# Patient Record
Sex: FEMALE | Race: WHITE | Employment: OTHER | URBAN - METROPOLITAN AREA
[De-identification: names, ages, dates, MRNs, and addresses within clinical notes are randomized per-mention and may not be internally consistent; named-entity substitution may affect disease eponyms.]

---

## 2017-05-08 ENCOUNTER — RADIANT APPOINTMENT (OUTPATIENT)
Dept: MAMMOGRAPHY | Facility: CLINIC | Age: 72
End: 2017-05-08
Payer: COMMERCIAL

## 2017-05-08 ENCOUNTER — OFFICE VISIT (OUTPATIENT)
Dept: OBGYN | Facility: CLINIC | Age: 72
End: 2017-05-08
Payer: COMMERCIAL

## 2017-05-08 VITALS
DIASTOLIC BLOOD PRESSURE: 68 MMHG | BODY MASS INDEX: 28.28 KG/M2 | WEIGHT: 176 LBS | SYSTOLIC BLOOD PRESSURE: 128 MMHG | HEIGHT: 66 IN

## 2017-05-08 DIAGNOSIS — Z01.419 ENCOUNTER FOR GYNECOLOGICAL EXAMINATION WITHOUT ABNORMAL FINDING: Primary | ICD-10-CM

## 2017-05-08 DIAGNOSIS — Z79.890 HORMONE REPLACEMENT THERAPY (POSTMENOPAUSAL): ICD-10-CM

## 2017-05-08 DIAGNOSIS — Z79.890 POST-MENOPAUSE ON HRT (HORMONE REPLACEMENT THERAPY): ICD-10-CM

## 2017-05-08 DIAGNOSIS — Z12.31 VISIT FOR SCREENING MAMMOGRAM: ICD-10-CM

## 2017-05-08 PROCEDURE — 99397 PER PM REEVAL EST PAT 65+ YR: CPT | Performed by: OBSTETRICS & GYNECOLOGY

## 2017-05-08 PROCEDURE — G0202 SCR MAMMO BI INCL CAD: HCPCS | Mod: TC

## 2017-05-08 RX ORDER — ESTRADIOL 0.04 MG/D
1 PATCH, EXTENDED RELEASE TRANSDERMAL
Qty: 24 PATCH | Refills: 3 | Status: SHIPPED | OUTPATIENT
Start: 2017-05-08 | End: 2018-05-15

## 2017-05-08 RX ORDER — MEDROXYPROGESTERONE ACETATE 5 MG
5 TABLET ORAL DAILY
Qty: 90 TABLET | Refills: 3 | Status: SHIPPED | OUTPATIENT
Start: 2017-05-08 | End: 2018-05-15

## 2017-05-08 ASSESSMENT — ANXIETY QUESTIONNAIRES
3. WORRYING TOO MUCH ABOUT DIFFERENT THINGS: NOT AT ALL
2. NOT BEING ABLE TO STOP OR CONTROL WORRYING: NOT AT ALL
6. BECOMING EASILY ANNOYED OR IRRITABLE: SEVERAL DAYS
5. BEING SO RESTLESS THAT IT IS HARD TO SIT STILL: NOT AT ALL
IF YOU CHECKED OFF ANY PROBLEMS ON THIS QUESTIONNAIRE, HOW DIFFICULT HAVE THESE PROBLEMS MADE IT FOR YOU TO DO YOUR WORK, TAKE CARE OF THINGS AT HOME, OR GET ALONG WITH OTHER PEOPLE: NOT DIFFICULT AT ALL
1. FEELING NERVOUS, ANXIOUS, OR ON EDGE: NOT AT ALL
7. FEELING AFRAID AS IF SOMETHING AWFUL MIGHT HAPPEN: NOT AT ALL
GAD7 TOTAL SCORE: 1

## 2017-05-08 ASSESSMENT — PATIENT HEALTH QUESTIONNAIRE - PHQ9: 5. POOR APPETITE OR OVEREATING: NOT AT ALL

## 2017-05-08 NOTE — MR AVS SNAPSHOT
"              After Visit Summary   2017    Kenia Rausch    MRN: [id number]           Patient Information     Date Of Birth          1945        Visit Information        Provider Department      2017 12:30 PM Ivett Cardenas MD [facility]        Today's Diagnoses     Encounter for gynecological examination without abnormal finding    -  1    Hormone replacement therapy (postmenopausal)        Post-menopause on HRT (hormone replacement therapy)           Follow-ups after your visit        Who to contact     If you have questions or need follow up information about today's clinic visit or your schedule please contact [facility] directly at [phone].  Normal or non-critical lab and imaging results will be communicated to you by MyChart, letter or phone within 4 business days after the clinic has received the results. If you do not hear from us within 7 days, please contact the clinic through MyChart or phone. If you have a critical or abnormal lab result, we will notify you by phone as soon as possible.  Submit refill requests through [employer] or call your pharmacy and they will forward the refill request to us. Please allow 3 business days for your refill to be completed.          Additional Information About Your Visit        MyChart Information     ClassOwl lets you send messages to your doctor, view your test results, renew your prescriptions, schedule appointments and more. To sign up, go to [email or web address] . Click on \"Log in\" on the left side of the screen, which will take you to the Welcome page. Then click on \"Sign up Now\" on the right side of the page.     You will be asked to enter the access code listed below, as well as some personal information. Please follow the directions to create your username and password.     Your access code is: [email or web address]  Expires: 2017  1:16 PM     Your access code will  in 90 days. If you need " "help or a new code, please call your [place] clinic or [phone].        Care EveryWhere ID     This is your Care EveryWhere ID. This could be used by other organizations to access your [place] medical records  [id number]        Your Vitals Were     Height BMI (Body Mass Index)                5' 6\" (1.676 m) 28.41 kg/m2           Blood Pressure from Last 3 Encounters:   05/08/17 128/68   04/20/16 120/70   04/13/15 112/66    Weight from Last 3 Encounters:   05/08/17 176 lb (79.8 kg)   04/20/16 173 lb (78.5 kg)   04/13/15 171 lb (77.6 kg)              Today, you had the following     No orders found for display         Where to get your medicines      These medications were sent to [id number] - [place], [place] - [address] AT [facility] [place] & [facility] [address]  [address], [place] [address]    Hours:  24-hours Phone:  [phone]     estradiol 0.0375 MG/24HR BIW patch    medroxyPROGESTERone 5 MG tablet          Primary Care Provider Office Phone # Fax #    [facility] [phone]       [address] [place] [address], [address]  [facility] [address]        Thank you!     Thank you for choosing [facility]  for your care. Our goal is always to provide you with excellent care. Hearing back from our patients is one way we can continue to improve our services. Please take a few minutes to complete the written survey that you may receive in the mail after your visit with us. Thank you!             Your Updated Medication List - Protect others around you: Learn how to safely use, store and throw away your medicines at www.disposemymeds.org.          This list is accurate as of: 5/8/17  1:16 PM.  Always use your most recent med list.                   Brand Name Dispense Instructions for use    estradiol 0.0375 MG/24HR BIW patch    VIVELLE-DOT    24 patch    Place 1 patch onto the skin twice a week       medroxyPROGESTERone 5 MG tablet    " PROVERA    90 tablet    Take 1 tablet (5 mg) by mouth daily       * NEXIUM PO          * esomeprazole 20 MG CR capsule    nexIUM    90 capsule    Take 1 capsule (20 mg) by mouth every morning (before breakfast) Take 30-60 minutes before eating.       valACYclovir 500 MG tablet    VALTREX    6 tablet    Take 500 mg by mouth 2 times daily Reported on 5/8/2017       * Notice:  This list has 2 medication(s) that are the same as other medications prescribed for you. Read the directions carefully, and ask your doctor or other care provider to review them with you.

## 2017-05-08 NOTE — PROGRESS NOTES
Kenia is a 71 year old  female who presents for annual exam.     Besides routine health maintenance, she has no other health concerns today .    HPI:  The patient's PCP is [facility].  Sees Dr Suarez  Patient has back injury right now, so hard time walking  mammo today  Wants to continue hormone replacement therapy, refilled      GYNECOLOGIC HISTORY:    No LMP recorded. Patient is postmenopausal.  Her current contraception method is: menopause.  She  reports that she has never smoked. She does not have any smokeless tobacco history on file.    Patient is sexually active.  STD testing offered?  Declined  Last PHQ-9 score on record =   PHQ-9 SCORE 2016   Total Score 0     Last GAD7 score on record =   NEELA-7 SCORE 2016   Total Score 0     Alcohol Score = 4    HEALTH MAINTENANCE:  Cholesterol: PCP does  Last Mammo: 16, Result: normal, Next Mammo: today   Pap:  PAP ) 12, WNL  Colonoscopy:  12/16/15, Result: polyps, Next Colonoscopy: 2017 years.  Dexa:  Done elsewhere cannot remember the date    Health maintenance updated:  yes    HISTORY:  Obstetric History       T2      TAB0   SAB0   E0   M0   L2       # Outcome Date GA Lbr [place]/2nd Weight Sex Delivery Anes PTL Lv   2 Term      CS-Unspec      1 Term      CS-Unspec             Patient Active Problem List   Diagnosis     fsocLUMBAR DISC DISPLACEMENT     Nonallopathic lesion of lumbar region     Nonallopathic lesion of thoracic region     Adhesive capsulitis     Past Surgical History:   Procedure Laterality Date     c-sections      X2     ORTHOPEDIC SURGERY  2004    risotomy, disectomy      Social History   Substance Use Topics     Smoking status: Never Smoker     Smokeless tobacco: Not on file     Alcohol use 0.0 oz/week     0 Standard drinks or equivalent per week      Comment: 1 daily       Problem (# of Occurrences) Relation (Name,Age of Onset)    Breast Cancer (1) Paternal Grandmother             Current Outpatient Prescriptions   Medication Sig     estradiol (VIVELLE-DOT) 0.0375 MG/24HR Place 1 patch onto the skin twice a week     valACYclovir (VALTREX) 500 MG tablet Take 1 tablet (500 mg) by mouth 2 times daily     medroxyPROGESTERone (PROVERA) 5 MG tablet Take 1 tablet (5 mg) by mouth daily     esomeprazole (NEXIUM) 20 MG capsule Take 1 capsule (20 mg) by mouth every morning (before breakfast) Take 30-60 minutes before eating.     Esomeprazole Magnesium (NEXIUM PO)      No current facility-administered medications for this visit.      Allergies   Allergen Reactions     Penicillins Hives       Past medical, surgical, social and family histories were reviewed and updated in EPIC.    ROS:   12 point review of systems negative other than symptoms noted below.    EXAM:  There were no vitals taken for this visit.   BMI: There is no height or weight on file to calculate BMI.    PHYSICAL EXAM:  Constitutional:  Appearance: Well nourished, well developed, alert, in no acute distress  Neck:  Lymph Nodes:  No lymphadenopathy present    Thyroid:  Gland size normal, nontender, no nodules or masses present  on palpation  Chest:  Respiratory Effort:  Breathing unlabored  Cardiovascular:    Heart: Auscultation:  Regular rate, normal rhythm, no murmurs present  Breasts: Inspection of Breasts:  No lymphadenopathy present    Palpation of Breasts and Axillae:  No masses present on palpation, no  breast tenderness    Axillary Lymph Nodes:  No lymphadenopathy present  Gastrointestinal:   Abdominal Examination:  Abdomen nontender to palpation, tone normal without rigidity or guarding, no masses present, umbilicus without lesions   Liver and Spleen:  No hepatomegaly present, liver nontender to palpation    Hernias:  No hernias present  Lymphatic: Lymph Nodes:  No other lymphadenopathy present  Skin:  General Inspection:  No rashes present, no lesions present, no areas of  discoloration    Genitalia and Groin:  No rashes  present, no lesions present, no areas of  discoloration, no masses present  Neurologic/Psychiatric:    Mental Status:  Oriented X3     Pelvic Exam:  External Genitalia:     Normal appearance for age, no discharge present, no tenderness present, no inflammatory lesions present, color normal  Vagina:     Normal vaginal vault without central or paravaginal defects, no discharge present, no inflammatory lesions present, no masses present  Bladder:     Nontender to palpation  Urethra:   Urethral Body:  Urethra palpation normal, urethra structural support normal   Urethral Meatus:  No erythema or lesions present  Cervix:     Appearance healthy, no lesions present, nontender to palpation, no bleeding present  Uterus:     Nontender to palpation, no masses present, position anteflexed, mobility: normal  Adnexa:     No adnexal tenderness present, no adnexal masses present  Perineum:     Perineum within normal limits, no evidence of trauma, no rashes or skin lesions present  Anus:     Anus within normal limits, no hemorrhoids present  Inguinal Lymph Nodes:     No lymphadenopathy present  Pubic Hair:     Normal pubic hair distribution for age  Genitalia and Groin:     No rashes present, no lesions present, no areas of discoloration, no masses present    COUNSELING:   Reviewed preventive health counseling, as reflected in patient instructions       Regular exercise       Healthy diet/nutrition    BMI: There is no height or weight on file to calculate BMI.      ASSESSMENT:  71 year old female with satisfactory annual exam.  No diagnosis found.    PLAN:  prob needs another dexa scan    Ivett Cardenas MD

## 2017-05-09 ASSESSMENT — PATIENT HEALTH QUESTIONNAIRE - PHQ9: SUM OF ALL RESPONSES TO PHQ QUESTIONS 1-9: 2

## 2017-05-09 ASSESSMENT — ANXIETY QUESTIONNAIRES: GAD7 TOTAL SCORE: 1

## 2017-06-12 ENCOUNTER — TELEPHONE (OUTPATIENT)
Dept: OBGYN | Facility: CLINIC | Age: 72
End: 2017-06-12

## 2017-06-12 NOTE — TELEPHONE ENCOUNTER
Reason for Call:  Other call back    Detailed comments: [phone] Option 1 call the number for PA  PA for estradiol (VIVELLE-DOT) 0.0375 MG/24HR BIW [facility]    Phone Number Patient can be reached at: Home number on file [phone] (home)    Best Time: Anytime    Can we leave a detailed message on this number? YES    Call taken on 6/12/2017 at 4:11 PM by Mallory Bro

## 2017-06-19 NOTE — TELEPHONE ENCOUNTER
Pt called back regarding her prior auth.  She says she got a letter from her insurance company saying her rx was denied.  Does she need approval from Dr. Cardenas?  The prescription is for Estradiol.  Please call her at [phone].  You can leave a VM if you can't reach her.  Please let her know when she can reach you.

## 2017-06-20 NOTE — TELEPHONE ENCOUNTER
Patient calling today to follow up on documentation for a PA. Dr. Cardenas are you wanting to move forward with PA or change her medication to something more affordable? I am in triage today so wouldn't have time to work this till tomorrow.

## 2017-06-21 NOTE — TELEPHONE ENCOUNTER
Spoke with Kuldip. Notified him that we never rec'd any questions sets, just the denial letter on 6/15/17. He started the appeal process and marked as urgent. Notified patient on voicemail of process.

## 2018-05-15 ENCOUNTER — RADIANT APPOINTMENT (OUTPATIENT)
Dept: MAMMOGRAPHY | Facility: CLINIC | Age: 73
End: 2018-05-15
Payer: COMMERCIAL

## 2018-05-15 ENCOUNTER — OFFICE VISIT (OUTPATIENT)
Dept: OBGYN | Facility: CLINIC | Age: 73
End: 2018-05-15
Payer: COMMERCIAL

## 2018-05-15 VITALS
HEIGHT: 66 IN | BODY MASS INDEX: 28.48 KG/M2 | SYSTOLIC BLOOD PRESSURE: 110 MMHG | WEIGHT: 177.2 LBS | DIASTOLIC BLOOD PRESSURE: 66 MMHG | HEART RATE: 78 BPM

## 2018-05-15 DIAGNOSIS — Z12.31 VISIT FOR SCREENING MAMMOGRAM: ICD-10-CM

## 2018-05-15 DIAGNOSIS — Z79.890 HORMONE REPLACEMENT THERAPY (POSTMENOPAUSAL): ICD-10-CM

## 2018-05-15 DIAGNOSIS — Z01.419 ENCOUNTER FOR GYNECOLOGICAL EXAMINATION WITHOUT ABNORMAL FINDING: Primary | ICD-10-CM

## 2018-05-15 PROCEDURE — 77063 BREAST TOMOSYNTHESIS BI: CPT | Mod: TC

## 2018-05-15 PROCEDURE — 77067 SCR MAMMO BI INCL CAD: CPT | Mod: TC

## 2018-05-15 PROCEDURE — 99397 PER PM REEVAL EST PAT 65+ YR: CPT | Performed by: OBSTETRICS & GYNECOLOGY

## 2018-05-15 RX ORDER — MEDROXYPROGESTERONE ACETATE 5 MG
5 TABLET ORAL DAILY
Qty: 90 TABLET | Refills: 3 | Status: SHIPPED | OUTPATIENT
Start: 2018-05-15 | End: 2019-05-21

## 2018-05-15 RX ORDER — ESTRADIOL 0.04 MG/D
1 PATCH, EXTENDED RELEASE TRANSDERMAL
Qty: 24 PATCH | Refills: 3 | Status: SHIPPED | OUTPATIENT
Start: 2018-05-17 | End: 2019-05-21

## 2018-05-15 NOTE — MR AVS SNAPSHOT
"              After Visit Summary   5/15/2018    Kenia Rausch    MRN: [id number]           Patient Information     Date Of Birth          1945        Visit Information        Provider Department      5/15/2018 2:00 PM Ivett Cardenas MD [facility]        Today's Diagnoses     Encounter for gynecological examination without abnormal finding    -  1    Hormone replacement therapy (postmenopausal)           Follow-ups after your visit        Who to contact     If you have questions or need follow up information about today's clinic visit or your schedule please contact [facility] directly at [phone].  Normal or non-critical lab and imaging results will be communicated to you by MyChart, letter or phone within 4 business days after the clinic has received the results. If you do not hear from us within 7 days, please contact the clinic through [employer] or phone. If you have a critical or abnormal lab result, we will notify you by phone as soon as possible.  Submit refill requests through Medstro or call your pharmacy and they will forward the refill request to us. Please allow 3 business days for your refill to be completed.          Additional Information About Your Visit        MyChart Information     Medstro lets you send messages to your doctor, view your test results, renew your prescriptions, schedule appointments and more. To sign up, go to [email or web address] . Click on \"Log in\" on the left side of the screen, which will take you to the Welcome page. Then click on \"Sign up Now\" on the right side of the page.     You will be asked to enter the access code listed below, as well as some personal information. Please follow the directions to create your username and password.     Your access code is: [email or web address]  Expires: 2018  2:23 PM     Your access code will  in 90 days. If you need help or a new code, please call your [place] clinic or " "[phone].        Care EveryWhere ID     This is your Care EveryWhere ID. This could be used by other organizations to access your [place] medical records  [id number]        Your Vitals Were     Pulse Height BMI (Body Mass Index)             78 5' 6\" (1.676 m) 28.6 kg/m2          Blood Pressure from Last 3 Encounters:   05/15/18 110/66   05/08/17 128/68   04/20/16 120/70    Weight from Last 3 Encounters:   05/15/18 177 lb 3.2 oz (80.4 kg)   05/08/17 176 lb (79.8 kg)   04/20/16 173 lb (78.5 kg)              Today, you had the following     No orders found for display         Where to get your medicines      These medications were sent to [id number] - [place], [place] - [address] AT [facility] [place] & [facility] [address]  [address], [place] [address]     Phone:  [phone]     estradiol 0.0375 MG/24HR BIW patch    medroxyPROGESTERone 5 MG tablet          Primary Care Provider Office Phone # Fax #    [facility] [phone]       [address] [place] [address], [address]  [facility] [address]        Equal Access to Services     [facility] NATALYA AH: Hadii korina ku hadasho Soomaali, waaxda luqadaha, qaybta kaalmada adeegyada, waxjair benítez hayjaylin cedeno . So [facility] [phone].    ATENCIÓN: Si habla español, tiene a ríos disposición servicios gratuitos de asistencia lingüística. Llame al [phone].    We comply with applicable federal civil rights laws and [place] laws. We do not discriminate on the basis of race, color, national origin, age, disability, sex, sexual orientation, or gender identity.            Thank you!     Thank you for choosing [facility]  for your care. Our goal is always to provide you with excellent care. Hearing back from our patients is one way we can continue to improve our services. Please take a few minutes to complete the written survey that you may receive in the mail after your visit with us. Thank " you!             Your Updated Medication List - Protect others around you: Learn how to safely use, store and throw away your medicines at www.disposemymeds.org.          This list is accurate as of 5/15/18  2:23 PM.  Always use your most recent med list.                   Brand Name Dispense Instructions for use Diagnosis    estradiol 0.0375 MG/24HR BIW patch   Start taking on:  5/17/2018    VIVELLE-DOT    24 patch    Place 1 patch onto the skin twice a week    Hormone replacement therapy (postmenopausal)       medroxyPROGESTERone 5 MG tablet    PROVERA    90 tablet    Take 1 tablet (5 mg) by mouth daily    Hormone replacement therapy (postmenopausal)       * NEXIUM PO           * esomeprazole 20 MG CR capsule    nexIUM    90 capsule    Take 1 capsule (20 mg) by mouth every morning (before breakfast) Take 30-60 minutes before eating.    Gastroesophageal reflux disease without esophagitis       valACYclovir 500 MG tablet    VALTREX    6 tablet    Take 500 mg by mouth 2 times daily Reported on 5/8/2017        * Notice:  This list has 2 medication(s) that are the same as other medications prescribed for you. Read the directions carefully, and ask your doctor or other care provider to review them with you.

## 2018-08-23 DIAGNOSIS — Z79.890 HORMONE REPLACEMENT THERAPY (POSTMENOPAUSAL): ICD-10-CM

## 2018-08-23 NOTE — TELEPHONE ENCOUNTER
"Requested Prescriptions   Pending Prescriptions Disp Refills     estradiol (VIVELLE-DOT) 0.0375 MG/24HR BIW patch [Pharmacy Med Name: ESTRADIOL 0.0375MG PATCH (TWICE WK)] 26 patch 3     Sig: APPLY 1 PATCH EXTERNALLY TO THE SKIN 2 TIMES A WEEK    Hormone Replacement Therapy Passed    8/23/2018  3:42 PM       Passed - Blood pressure under 140/90 in past 12 months    BP Readings from Last 3 Encounters:   05/15/18 110/66   05/08/17 128/68   04/20/16 120/70                Passed - Recent (12 mo) or future (30 days) visit within the authorizing provider's specialty    Patient had office visit in the last 12 months or has a visit in the next 30 days with authorizing provider or within the authorizing provider's specialty.  See \"Patient Info\" tab in inbasket, or \"Choose Columns\" in Meds & Orders section of the refill encounter.           Passed - Patient has mammogram in past 2 years on file if age 50-75       Passed - Patient is 18 years of age or older       Passed - No active pregnancy on record       Passed - No positive pregnancy test on record in past 12 months        Last Written Prescription Date:  5/17/2018  Last Fill Quantity: 24,  # refills: 3   Last office visit: 5/15/2018 with prescribing provider:  Dr. Ivett Cardenas   Future Office Visit:  NONE    "

## 2018-08-24 RX ORDER — ESTRADIOL 0.04 MG/D
PATCH, EXTENDED RELEASE TRANSDERMAL
Qty: 26 PATCH | Refills: 3 | OUTPATIENT
Start: 2018-08-24

## 2018-08-24 NOTE — TELEPHONE ENCOUNTER
Refill sent 5/17/18. Pharmacy requesting 90 day supply. Informed that is 90 day supply  Refill denied.       PLAN:  Refilled hormone replacement therapy, doesn't want to stop  Had bone density checked already

## 2018-11-15 DIAGNOSIS — Z79.890 POST-MENOPAUSE ON HRT (HORMONE REPLACEMENT THERAPY): ICD-10-CM

## 2018-11-15 RX ORDER — MEDROXYPROGESTERONE ACETATE 5 MG
TABLET ORAL
Qty: 90 TABLET | Refills: 0 | OUTPATIENT
Start: 2018-11-15

## 2018-11-15 NOTE — TELEPHONE ENCOUNTER
Requested Prescriptions   Pending Prescriptions Disp Refills     medroxyPROGESTERone (PROVERA) 5 MG tablet [Pharmacy Med Name: MEDROXYPROGESTERONE 5MG TABLETS] 90 tablet 0     Sig: TAKE 1 TABLET(5 MG) BY MOUTH DAILY    There is no refill protocol information for this order        Last Written Prescription Date:  5/15/18  Last Fill Quantity: 90,  # refills: 3   Last office visit: 5/15/2018 with prescribing provider:  Theresa   Future Office Visit:

## 2019-01-09 ENCOUNTER — HOSPITAL ENCOUNTER (OUTPATIENT)
Facility: CLINIC | Age: 74
Discharge: HOME | End: 2019-01-09
Attending: COLON & RECTAL SURGERY | Admitting: COLON & RECTAL SURGERY
Payer: COMMERCIAL

## 2019-01-09 VITALS
HEART RATE: 86 BPM | HEIGHT: 66 IN | BODY MASS INDEX: 27.32 KG/M2 | RESPIRATION RATE: 11 BRPM | WEIGHT: 170 LBS | OXYGEN SATURATION: 95 % | SYSTOLIC BLOOD PRESSURE: 155 MMHG | DIASTOLIC BLOOD PRESSURE: 85 MMHG

## 2019-01-09 LAB — COLONOSCOPY: NORMAL

## 2019-01-09 PROCEDURE — 25000128 H RX IP 250 OP 636: Performed by: COLON & RECTAL SURGERY

## 2019-01-09 PROCEDURE — G0105 COLORECTAL SCRN; HI RISK IND: HCPCS | Performed by: COLON & RECTAL SURGERY

## 2019-01-09 PROCEDURE — G0500 MOD SEDAT ENDO SERVICE >5YRS: HCPCS | Performed by: COLON & RECTAL SURGERY

## 2019-01-09 PROCEDURE — 25000132 ZZH RX MED GY IP 250 OP 250 PS 637: Performed by: COLON & RECTAL SURGERY

## 2019-01-09 PROCEDURE — 45378 DIAGNOSTIC COLONOSCOPY: CPT | Performed by: COLON & RECTAL SURGERY

## 2019-01-09 RX ORDER — LIDOCAINE 40 MG/G
CREAM TOPICAL
Status: DISCONTINUED | OUTPATIENT
Start: 2019-01-09 | End: 2019-01-09 | Stop reason: HOSPADM

## 2019-01-09 RX ORDER — FLUMAZENIL 0.1 MG/ML
0.2 INJECTION, SOLUTION INTRAVENOUS
Status: DISCONTINUED | OUTPATIENT
Start: 2019-01-09 | End: 2019-01-09 | Stop reason: HOSPADM

## 2019-01-09 RX ORDER — FENTANYL CITRATE 50 UG/ML
INJECTION, SOLUTION INTRAMUSCULAR; INTRAVENOUS PRN
Status: DISCONTINUED | OUTPATIENT
Start: 2019-01-09 | End: 2019-01-09 | Stop reason: HOSPADM

## 2019-01-09 RX ORDER — NALOXONE HYDROCHLORIDE 0.4 MG/ML
.1-.4 INJECTION, SOLUTION INTRAMUSCULAR; INTRAVENOUS; SUBCUTANEOUS
Status: DISCONTINUED | OUTPATIENT
Start: 2019-01-09 | End: 2019-01-09 | Stop reason: HOSPADM

## 2019-01-09 RX ORDER — SIMETHICONE 40MG/0.6ML
SUSPENSION, DROPS(FINAL DOSAGE FORM)(ML) ORAL PRN
Status: DISCONTINUED | OUTPATIENT
Start: 2019-01-09 | End: 2019-01-09 | Stop reason: HOSPADM

## 2019-01-09 RX ORDER — ONDANSETRON 4 MG/1
4 TABLET, ORALLY DISINTEGRATING ORAL EVERY 6 HOURS PRN
Status: DISCONTINUED | OUTPATIENT
Start: 2019-01-09 | End: 2019-01-09 | Stop reason: HOSPADM

## 2019-01-09 RX ORDER — SODIUM CHLORIDE 9 MG/ML
INJECTION, SOLUTION INTRAVENOUS CONTINUOUS PRN
Status: DISCONTINUED | OUTPATIENT
Start: 2019-01-09 | End: 2019-01-09 | Stop reason: HOSPADM

## 2019-01-09 RX ORDER — ONDANSETRON 2 MG/ML
4 INJECTION INTRAMUSCULAR; INTRAVENOUS
Status: DISCONTINUED | OUTPATIENT
Start: 2019-01-09 | End: 2019-01-09 | Stop reason: HOSPADM

## 2019-01-09 RX ORDER — ONDANSETRON 2 MG/ML
4 INJECTION INTRAMUSCULAR; INTRAVENOUS EVERY 6 HOURS PRN
Status: DISCONTINUED | OUTPATIENT
Start: 2019-01-09 | End: 2019-01-09 | Stop reason: HOSPADM

## 2019-01-09 ASSESSMENT — MIFFLIN-ST. JEOR: SCORE: 1302.86

## 2019-01-09 NOTE — H&P
Pre-Endoscopy History and Physical     Kenia Rausch MRN# [id number]   YOB: 1945 Age: 73 year old     Date of Procedure: 1/9/2019  [facility]  Type of Endoscopy: colonoscopy  Reason for Procedure: screening, history of adenomatous polyps  Type of Anesthesia Anticipated: moderate sedation    HPI:    Kenia is a 73 year old female who will be undergoing the above procedure.  Patient has had adenomatous polyps on previous colonoscopies. Patient denies a change in her bowel habits or bleeding. She does note some diarrhea with traveling.     A history and physical has been performed. The patient's medications and allergies have been reviewed. The risks and benefits of the procedure and the sedation options and risks were discussed with the patient.  All questions were answered and informed consent was obtained.      She denies a personal or family history of anesthesia complications or bleeding disorders.   Prior to Admission medications    Medication Sig Start Date End Date Taking? Authorizing Provider   esomeprazole (NEXIUM) 20 MG capsule Take 1 capsule (20 mg) by mouth every morning (before breakfast) Take 30-60 minutes before eating. 4/20/16  Yes Ivett Cardenas MD   estradiol (VIVELLE-DOT) 0.0375 MG/24HR BIW patch Place 1 patch onto the skin twice a week 5/17/18  Yes Ivett Cardenas MD   medroxyPROGESTERone (PROVERA) 5 MG tablet Take 1 tablet (5 mg) by mouth daily 5/15/18  Yes Ivett Cardenas MD   Esomeprazole Magnesium (NEXIUM PO)     Reported, Patient   valACYclovir (VALTREX) 500 MG tablet Take 500 mg by mouth 2 times daily Reported on 5/8/2017    Ivett Cardenas MD       Allergies   Allergen Reactions     Penicillins Hives        Current Facility-Administered Medications   Medication     lidocaine (LMX4) cream     lidocaine 1 % 1 mL     ondansetron (ZOFRAN) injection 4 mg     sodium chloride (PF) 0.9% PF flush 3 mL     sodium chloride  "(PF) 0.9% PF flush 3 mL       Patient Active Problem List   Diagnosis     fsocLUMBAR DISC DISPLACEMENT     Nonallopathic lesion of lumbar region     Nonallopathic lesion of thoracic region     Adhesive capsulitis     GERD (gastroesophageal reflux disease)     Vasomotor flushing        Past Medical History:   Diagnosis Date     Depression 1983     Postmenopausal HRT (hormone replacement therapy)     tried quitting but got resp sx, hot flashes, insomnia so resumed, had BTB after 1 yr unapposed so resumed provera, now on 2.5mg daily and dot        Past Surgical History:   Procedure Laterality Date     c-sections      X2     ORTHOPEDIC SURGERY  2004    risotomy, disectomy       Social History     Tobacco Use     Smoking status: Never Smoker     Smokeless tobacco: Never Used   Substance Use Topics     Alcohol use: Yes     Alcohol/week: 0.0 oz     Comment: 1 daily        Family History   Problem Relation Age of Onset     Breast Cancer Paternal Grandmother      Bladder Cancer Mother      Prostate Cancer Father        REVIEW OF SYSTEMS:     5 point ROS negative except as noted above in HPI, including Gen., Resp., CV, GI &  system review.      PHYSICAL EXAM:   /79   Pulse 97   Ht 1.676 m (5' 6\")   Wt 77.1 kg (170 lb)   SpO2 95%   BMI 27.44 kg/m   Estimated body mass index is 27.44 kg/m  as calculated from the following:    Height as of this encounter: 1.676 m (5' 6\").    Weight as of this encounter: 77.1 kg (170 lb).   GENERAL APPEARANCE: healthy  MENTAL STATUS: alert  AIRWAY EXAM: Mallampatti Class II (visualization of the soft palate, fauces, and uvula)  RESP: lungs clear to auscultation - no rales, rhonchi or wheezes  CV: regular rates and rhythm      DIAGNOSTICS:    Not indicated      IMPRESSION   ASA Class 2 - Mild systemic disease        PLAN:       Colonoscopy with possible polypectomy, possible biopsy. The indications, procedure and risks were explained to the patient who agrees to proceed.       The " above has been forwarded to the consulting provider.      Signed Electronically by: Marychuy Schwab  January 9, 2019

## 2019-01-09 NOTE — BRIEF OP NOTE
[facility]    Brief Operative Note    Pre-operative diagnosis: PERSONAL HISTORY OF COLONIC POLYPS  Post-operative diagnosis normal colon  Procedure: Procedure(s):  COLONOSCOPY  Surgeon: Surgeon(s) and Role:     * Marychuy Schwab MD - Primary  Anesthesia: Conscious Sedation   Estimated blood loss: None  Drains: None  Specimens: * No specimens in log *  Findings:   See Provation procedure note in Epic    Complications: None.  Implants: None.

## 2019-04-19 DIAGNOSIS — Z79.890 HORMONE REPLACEMENT THERAPY (POSTMENOPAUSAL): ICD-10-CM

## 2019-04-19 RX ORDER — MEDROXYPROGESTERONE ACETATE 5 MG
TABLET ORAL
Qty: 90 TABLET | Refills: 0 | OUTPATIENT
Start: 2019-04-19

## 2019-04-19 NOTE — TELEPHONE ENCOUNTER
Requested Prescriptions   Pending Prescriptions Disp Refills     medroxyPROGESTERone (PROVERA) 5 MG tablet [Pharmacy Med Name: MEDROXYPROGESTERONE 5MG TABLETS] 90 tablet 0     Sig: TAKE 1 TABLET(5 MG) BY MOUTH DAILY       There is no refill protocol information for this order        Pt has refills available until annual exam  Next 5 appointments (look out 90 days)    May 21, 2019  2:00 PM CDT  PHYSICAL with Ivett Cardenas MD  [facility] [address] [place] [id number]  [phone]        Marian Jones RN on 4/19/2019 at 10:36 AM

## 2019-05-16 NOTE — PROGRESS NOTES
Kenia is a 73 year old  female who presents for annual exam.     Besides routine health maintenance, she has no other health concerns today .    Do you have a Health Care Directive?: No: Advance care planning was reviewed with patient; patient declined at this time.    Fall risk:   Fallen 2 or more times in the past year?: No  Any fall with injury in the past year?: Yes    HPI:  The patient's PCP is [facility]. Patient is very active, lots of biking, hiking  Is still on tiny dose of hormone replacement therapy and doesn't want to stop  Only taking half of lowest dose    Takes otc nexium all the time so I rec she get Vit D checked every couple yrs since isn't taking any    Says she already had dexa    GYNECOLOGIC HISTORY:  No LMP recorded. Patient is postmenopausal..   reports that she has never smoked. She has never used smokeless tobacco.    Patient is sexually active.  STD testing offered?  Declined  Last PHQ-9 score on record=   PHQ-9 SCORE 2019   PHQ-9 Total Score 2     Last GAD7 score on record=   NEELA-7 SCORE 2016   Total Score 0 1 2     Alcohol Score = 4    HEALTH MAINTENANCE:  Cholesterol: (No results found for: CHOL   Last Mammo: 5/15/18, Result: normal, Next Mammo: today   Pap: (No results found for: PAP )  DEXA:  11/3/17 @ [facility]  Colonoscopy:  19, Result:  normal, Next Colonoscopy: every 5 years.    Health maintenance updated:  yes    HISTORY:  OB History    Para Term  AB Living   2 2 2 0 0 2   SAB TAB Ectopic Multiple Live Births   0 0 0 0 0      # Outcome Date GA Lbr [place]/2nd Weight Sex Delivery Anes PTL Lv   2 Term      CS-Unspec      1 Term      CS-Unspec        Patient Active Problem List   Diagnosis     fsocLUMBAR DISC DISPLACEMENT     Nonallopathic lesion of lumbar region     Nonallopathic lesion of thoracic region     Adhesive capsulitis     GERD (gastroesophageal reflux disease)     Vasomotor flushing     Past Surgical  "History:   Procedure Laterality Date     c-sections      X2     COLONOSCOPY N/A 1/9/2019    Procedure: COLONOSCOPY;  Surgeon: Marychuy Schwab MD;  Location:  GI     ORTHOPEDIC SURGERY  2004    risotomy, disectomy      Social History     Tobacco Use     Smoking status: Never Smoker     Smokeless tobacco: Never Used   Substance Use Topics     Alcohol use: Yes     Alcohol/week: 0.0 oz     Comment: 1 daily       Problem (# of Occurrences) Relation (Name,Age of Onset)    Bladder Cancer (1) Mother    Breast Cancer (1) Paternal Grandmother    Prostate Cancer (1) Father            Current Outpatient Medications   Medication Sig     esomeprazole (NEXIUM) 20 MG capsule Take 1 capsule (20 mg) by mouth every morning (before breakfast) Take 30-60 minutes before eating.     [START ON 5/23/2019] estradiol (VIVELLE-DOT) 0.0375 MG/24HR BIW patch Place 1 patch onto the skin twice a week     medroxyPROGESTERone (PROVERA) 5 MG tablet Take 1 tablet (5 mg) by mouth daily     valACYclovir (VALTREX) 500 MG tablet Take 500 mg by mouth 2 times daily Reported on 5/8/2017     No current facility-administered medications for this visit.        Allergies   Allergen Reactions     Penicillins Hives       Past medical, surgical, social and family history were reviewed and updated in EPIC.    ROS:   12 point review of systems negative other than symptoms noted below.    EXAM:  /64   Pulse 76   Ht 1.683 m (5' 6.25\")   Wt 79.6 kg (175 lb 6.4 oz)   BMI 28.10 kg/m     BMI: Body mass index is 28.1 kg/m .    EXAM:  Constitutional: Appearance: Well nourished, well developed alert, in no acute distress  Neck:  Lymph Nodes:  No lymphadenopathy present    Thyroid:  Gland size normal, nontender, no nodules or masses present  on palpation  Chest:  Respiratory Effort:  Breathing unlabored  Cardiovascular:Heart    Auscultation:  Regular rate, normal rhythm, no murmurs present  Breasts: Inspection of Breasts:  No lymphadenopathy present., " Palpation of Breasts and Axillae:  No masses present on palpation, no breast tenderness., Axillary Lymph Nodes:  No lymphadenopathy present. and No nodularity, asymmetry or nipple discharge bilaterally.  Gastrointestinal:  Abdominal Examination:  Abdomen nontender to palpation, tone normal without     rigidity or guarding, no masses present, umbilicus without lesions    Liver and speen:  No hepatomegaly present, liver nontender to palpation    Hernias:  No hernias present  Lymphatic: Lymph Nodes:  No other lymphadenopathy present  Skin:  General Inspection:  No rashes present, no lesions present, no areas of  discoloration.    Genitalia and Groin:  No rashes present, no lesions present, no areas of  discoloration, no masses present  Neurologic/Psychiatric:    Mental Status:  Oriented X3     Pelvic Exam:  External Genitalia:     Normal appearance for age, no discharge present, no tenderness present, no inflammatory lesions present, color normal  Vagina:     Normal vaginal vault without central or paravaginal defects, ATROPHIC  Bladder:     Nontender to palpation  Urethra:   Urethral Body:  Urethra palpation normal, urethra structural support normal   Urethral Meatus:  No erythema or lesions present  Cervix:     Appearance healthy, no lesions present, nontender to palpation, no bleeding present  Uterus:     Nontender to palpation, no masses present, position anteflexed, mobility: normal  Adnexa:     No adnexal tenderness present, no adnexal masses present  Perineum:     Perineum within normal limits, no evidence of trauma, no rashes or skin lesions present  Inguinal Lymph Nodes:     No lymphadenopathy present      COUNSELING:   Reviewed preventive health counseling, as reflected in patient instructions       (Elo)menopause management    BMI:  Body mass index is 28.1 kg/m .  Weight management plan: Discussed healthy diet and exercise guidelines   reports that she has never smoked. She has never used smokeless  tobacco.      ASSESSMENT:  73 year old female with satisfactory annual exam.    ICD-10-CM    1. Encounter for gynecological examination without abnormal finding Z01.419    2. Hormone replacement therapy (postmenopausal) Z79.890 estradiol (VIVELLE-DOT) 0.0375 MG/24HR BIW patch     medroxyPROGESTERone (PROVERA) 5 MG tablet       PLAN:  We discussed risks of hormone replacement therapy at her age.  Patient wishes to continue so refilled today    Discussed that we rec 2000 international unit(s) Vit D daily.  nexium may decrease absorption so would be good to check her level at least every couple years. She isn't taking any D or calcium.    Discussed that if she ever develops VTE or requires anticoagulation or antiplatelet med then I will no longer refill hormone replacement therapy.     Ivett Cardenas MD

## 2019-05-21 ENCOUNTER — ANCILLARY PROCEDURE (OUTPATIENT)
Dept: MAMMOGRAPHY | Facility: CLINIC | Age: 74
End: 2019-05-21
Payer: COMMERCIAL

## 2019-05-21 ENCOUNTER — OFFICE VISIT (OUTPATIENT)
Dept: OBGYN | Facility: CLINIC | Age: 74
End: 2019-05-21
Payer: COMMERCIAL

## 2019-05-21 VITALS
HEART RATE: 76 BPM | DIASTOLIC BLOOD PRESSURE: 64 MMHG | BODY MASS INDEX: 28.19 KG/M2 | WEIGHT: 175.4 LBS | SYSTOLIC BLOOD PRESSURE: 114 MMHG | HEIGHT: 66 IN

## 2019-05-21 DIAGNOSIS — Z01.419 ENCOUNTER FOR GYNECOLOGICAL EXAMINATION WITHOUT ABNORMAL FINDING: Primary | ICD-10-CM

## 2019-05-21 DIAGNOSIS — Z12.31 VISIT FOR SCREENING MAMMOGRAM: ICD-10-CM

## 2019-05-21 DIAGNOSIS — Z79.890 HORMONE REPLACEMENT THERAPY (POSTMENOPAUSAL): ICD-10-CM

## 2019-05-21 PROCEDURE — 99397 PER PM REEVAL EST PAT 65+ YR: CPT | Performed by: OBSTETRICS & GYNECOLOGY

## 2019-05-21 PROCEDURE — 77063 BREAST TOMOSYNTHESIS BI: CPT | Mod: TC

## 2019-05-21 PROCEDURE — 77067 SCR MAMMO BI INCL CAD: CPT | Mod: TC

## 2019-05-21 RX ORDER — MEDROXYPROGESTERONE ACETATE 5 MG
5 TABLET ORAL DAILY
Qty: 90 TABLET | Refills: 3 | Status: SHIPPED | OUTPATIENT
Start: 2019-05-21 | End: 2020-07-22

## 2019-05-21 RX ORDER — ESTRADIOL 0.04 MG/D
1 PATCH, EXTENDED RELEASE TRANSDERMAL
Qty: 24 PATCH | Refills: 3 | Status: SHIPPED | OUTPATIENT
Start: 2019-05-23 | End: 2020-09-15

## 2019-05-21 ASSESSMENT — ANXIETY QUESTIONNAIRES
2. NOT BEING ABLE TO STOP OR CONTROL WORRYING: SEVERAL DAYS
5. BEING SO RESTLESS THAT IT IS HARD TO SIT STILL: NOT AT ALL
GAD7 TOTAL SCORE: 2
3. WORRYING TOO MUCH ABOUT DIFFERENT THINGS: NOT AT ALL
1. FEELING NERVOUS, ANXIOUS, OR ON EDGE: SEVERAL DAYS
6. BECOMING EASILY ANNOYED OR IRRITABLE: NOT AT ALL
IF YOU CHECKED OFF ANY PROBLEMS ON THIS QUESTIONNAIRE, HOW DIFFICULT HAVE THESE PROBLEMS MADE IT FOR YOU TO DO YOUR WORK, TAKE CARE OF THINGS AT HOME, OR GET ALONG WITH OTHER PEOPLE: NOT DIFFICULT AT ALL
7. FEELING AFRAID AS IF SOMETHING AWFUL MIGHT HAPPEN: NOT AT ALL

## 2019-05-21 ASSESSMENT — MIFFLIN-ST. JEOR: SCORE: 1331.33

## 2019-05-21 ASSESSMENT — PATIENT HEALTH QUESTIONNAIRE - PHQ9
5. POOR APPETITE OR OVEREATING: NOT AT ALL
SUM OF ALL RESPONSES TO PHQ QUESTIONS 1-9: 2

## 2019-05-22 ASSESSMENT — ANXIETY QUESTIONNAIRES: GAD7 TOTAL SCORE: 2

## 2019-05-23 ENCOUNTER — TELEPHONE (OUTPATIENT)
Dept: OBGYN | Facility: CLINIC | Age: 74
End: 2019-05-23

## 2019-05-23 NOTE — TELEPHONE ENCOUNTER
Prior Authorization Retail Medication Request  CoverMyMeds Key: [id number]    Medication/Dose: estradiol (VIVELLE-DOT) 0.0375 MG/24HR BIW patch  ICD code (if different than what is on RX):    Previously Tried and Failed:    Rationale:        Insurance Name:  UCARE MEDICARE NON A  Insurance ID:  [id number]

## 2019-05-28 NOTE — TELEPHONE ENCOUNTER
Central Prior Authorization Team  Phone: [phone]    PA Initiation    Medication: estradiol (VIVELLE-DOT) 0.0375 MG/24HR BIW patch  Insurance Company: [employer] - Phone [phone] Fax [phone]  Pharmacy Filling the Rx: "[id number] - [place], [place] - [address] AT [facility] [address] [place] [address]  Filling Pharmacy Phone: [phone]  Filling Pharmacy Fax:    Start Date: 5/28/2019

## 2019-05-29 NOTE — TELEPHONE ENCOUNTER
PRIOR AUTHORIZATION DENIED    Medication: estradiol (VIVELLE-DOT) 0.0375 MG/24HR BIW patch- DENIED     Denial Date: 5/29/2019    Denial Rational: Patient must have a history of trial & failure to the formulary alternative(s) or have a contraindication or intolerance to the formulary alternative:  Alendronate, Ibandronate, Risedronate, or Raloxifene.     Appeal Information: If you would like to appeal, please provide a letter of medical necessity.

## 2019-05-30 NOTE — TELEPHONE ENCOUNTER
left message to call back-how does she want to proceed? She can pay cash, or can send to Dr. Cardenas for more affordable alternatives.

## 2019-06-05 NOTE — TELEPHONE ENCOUNTER
I called patient  She has been on long standing very low dose estradiol patch ( vivelle dot) which she actually cuts in half and uses twice a week.     She takes it for hot flashes since they disrupt her sleep   She has already tried various estrogen pills with too many side effects.  She also has a very active lifestyle and hikes a lot, even had a fractured leg while hiking.  Has some osteopenia and with her lifestyle wanted the extra protection of the low dose estrogen.  She doesn't have osteoporosis so bisphosphonates are not inidcated and would not help her vasomotor symptoms at all.     I do not have a better patch alternative.   We can print out her current prescription if she decides she wants to do some comparison cash shopping on the prescription.     Her supplemental insurance changed this years and this is the first time she has been denied on this prescription.     I explained the FDA warnings on estrogen in older women and Medicare's position on its use.

## 2020-07-22 DIAGNOSIS — Z79.890 HORMONE REPLACEMENT THERAPY (POSTMENOPAUSAL): ICD-10-CM

## 2020-07-22 RX ORDER — MEDROXYPROGESTERONE ACETATE 5 MG
TABLET ORAL
Qty: 90 TABLET | Refills: 0 | Status: SHIPPED | OUTPATIENT
Start: 2020-07-22 | End: 2020-10-07

## 2020-07-22 NOTE — TELEPHONE ENCOUNTER
Requested Prescriptions   Pending Prescriptions Disp Refills     medroxyPROGESTERone (PROVERA) 5 MG tablet [Pharmacy Med Name: MEDROXYPROGESTERONE 5MG TABLETS] 90 tablet 3     Sig: TAKE 1 TABLET(5 MG) BY MOUTH DAILY       There is no refill protocol information for this order        Last Written Prescription Date:  5/21/19  Last Fill Quantity: 90,  # refills: 3   Last office visit: 5/21/2019 with prescribing provider:  Dr. Cardenas     Future Office Visit:   Next 5 appointments (look out 90 days)    Sep 15, 2020 10:00 AM CDT  PHYSICAL with Ivett Cardenas MD  [facility] [address] [place] [id number] [phone]         Refill sent  Marian Jones RN on 7/22/2020 at 9:52 AM

## 2020-09-15 ENCOUNTER — ANCILLARY PROCEDURE (OUTPATIENT)
Dept: MAMMOGRAPHY | Facility: CLINIC | Age: 75
End: 2020-09-15
Payer: COMMERCIAL

## 2020-09-15 ENCOUNTER — OFFICE VISIT (OUTPATIENT)
Dept: OBGYN | Facility: CLINIC | Age: 75
End: 2020-09-15
Payer: COMMERCIAL

## 2020-09-15 VITALS
SYSTOLIC BLOOD PRESSURE: 122 MMHG | DIASTOLIC BLOOD PRESSURE: 64 MMHG | HEIGHT: 66 IN | HEART RATE: 68 BPM | WEIGHT: 171 LBS | BODY MASS INDEX: 27.48 KG/M2

## 2020-09-15 DIAGNOSIS — Z12.31 VISIT FOR SCREENING MAMMOGRAM: ICD-10-CM

## 2020-09-15 DIAGNOSIS — Z01.419 ENCOUNTER FOR GYNECOLOGICAL EXAMINATION WITHOUT ABNORMAL FINDING: Primary | ICD-10-CM

## 2020-09-15 DIAGNOSIS — Z13.21 ENCOUNTER FOR VITAMIN DEFICIENCY SCREENING: ICD-10-CM

## 2020-09-15 DIAGNOSIS — Z23 NEED FOR VACCINATION: ICD-10-CM

## 2020-09-15 DIAGNOSIS — Z23 NEED FOR PROPHYLACTIC VACCINATION AND INOCULATION AGAINST INFLUENZA: ICD-10-CM

## 2020-09-15 DIAGNOSIS — E55.9 VITAMIN D DEFICIENCY, UNSPECIFIED: ICD-10-CM

## 2020-09-15 PROCEDURE — 90662 IIV NO PRSV INCREASED AG IM: CPT | Performed by: OBSTETRICS & GYNECOLOGY

## 2020-09-15 PROCEDURE — 36415 COLL VENOUS BLD VENIPUNCTURE: CPT | Performed by: OBSTETRICS & GYNECOLOGY

## 2020-09-15 PROCEDURE — G0009 ADMIN PNEUMOCOCCAL VACCINE: HCPCS | Performed by: OBSTETRICS & GYNECOLOGY

## 2020-09-15 PROCEDURE — 82306 VITAMIN D 25 HYDROXY: CPT | Performed by: OBSTETRICS & GYNECOLOGY

## 2020-09-15 PROCEDURE — G0008 ADMIN INFLUENZA VIRUS VAC: HCPCS | Performed by: OBSTETRICS & GYNECOLOGY

## 2020-09-15 PROCEDURE — 77067 SCR MAMMO BI INCL CAD: CPT | Mod: TC

## 2020-09-15 PROCEDURE — 77063 BREAST TOMOSYNTHESIS BI: CPT | Mod: TC

## 2020-09-15 PROCEDURE — 90670 PCV13 VACCINE IM: CPT | Performed by: OBSTETRICS & GYNECOLOGY

## 2020-09-15 PROCEDURE — 99397 PER PM REEVAL EST PAT 65+ YR: CPT | Mod: 25 | Performed by: OBSTETRICS & GYNECOLOGY

## 2020-09-15 RX ORDER — CYCLOBENZAPRINE HCL 5 MG
5 TABLET ORAL PRN
COMMUNITY
Start: 2020-08-12 | End: 2020-10-07

## 2020-09-15 RX ORDER — ESTRADIOL 0.04 MG/D
1 PATCH, EXTENDED RELEASE TRANSDERMAL
Qty: 24 PATCH | Refills: 3 | Status: CANCELLED | OUTPATIENT
Start: 2020-09-17

## 2020-09-15 RX ORDER — MEDROXYPROGESTERONE ACETATE 5 MG
TABLET ORAL
Qty: 90 TABLET | Refills: 3 | Status: CANCELLED | OUTPATIENT
Start: 2020-09-15

## 2020-09-15 SDOH — HEALTH STABILITY: MENTAL HEALTH: HOW OFTEN DO YOU HAVE A DRINK CONTAINING ALCOHOL?: 4 OR MORE TIMES A WEEK

## 2020-09-15 SDOH — HEALTH STABILITY: MENTAL HEALTH: HOW OFTEN DO YOU HAVE 6 OR MORE DRINKS ON ONE OCCASION?: NEVER

## 2020-09-15 SDOH — HEALTH STABILITY: MENTAL HEALTH: HOW MANY STANDARD DRINKS CONTAINING ALCOHOL DO YOU HAVE ON A TYPICAL DAY?: 1 OR 2

## 2020-09-15 ASSESSMENT — ANXIETY QUESTIONNAIRES
2. NOT BEING ABLE TO STOP OR CONTROL WORRYING: NOT AT ALL
IF YOU CHECKED OFF ANY PROBLEMS ON THIS QUESTIONNAIRE, HOW DIFFICULT HAVE THESE PROBLEMS MADE IT FOR YOU TO DO YOUR WORK, TAKE CARE OF THINGS AT HOME, OR GET ALONG WITH OTHER PEOPLE: NOT DIFFICULT AT ALL
GAD7 TOTAL SCORE: 1
3. WORRYING TOO MUCH ABOUT DIFFERENT THINGS: NOT AT ALL
1. FEELING NERVOUS, ANXIOUS, OR ON EDGE: NOT AT ALL
6. BECOMING EASILY ANNOYED OR IRRITABLE: SEVERAL DAYS
7. FEELING AFRAID AS IF SOMETHING AWFUL MIGHT HAPPEN: NOT AT ALL
5. BEING SO RESTLESS THAT IT IS HARD TO SIT STILL: NOT AT ALL

## 2020-09-15 ASSESSMENT — MIFFLIN-ST. JEOR: SCORE: 1307.78

## 2020-09-15 ASSESSMENT — PATIENT HEALTH QUESTIONNAIRE - PHQ9
SUM OF ALL RESPONSES TO PHQ QUESTIONS 1-9: 2
5. POOR APPETITE OR OVEREATING: NOT AT ALL

## 2020-09-15 NOTE — NURSING NOTE
Prior to immunization administration, verified patients identity using patient s name and date of birth. Please see Immunization Activity for additional information.     Screening Questionnaire for Adult Immunization    Are you sick today?   No   Do you have allergies to medications, food, a vaccine component or latex?   No   Have you ever had a serious reaction after receiving a vaccination?   No   Do you have a long-term health problem with heart, lung, kidney, or metabolic disease (e.g., diabetes), asthma, a blood disorder, no spleen, complement component deficiency, a cochlear implant, or a spinal fluid leak?  Are you on long-term aspirin therapy?   No   Do you have cancer, leukemia, HIV/AIDS, or any other immune system problem?   No   Do you have a parent, brother, or sister with an immune system problem?   No   In the past 3 months, have you taken medications that affect  your immune system, such as prednisone, other steroids, or anticancer drugs; drugs for the treatment of rheumatoid arthritis, Crohn s disease, or psoriasis; or have you had radiation treatments?   No   Have you had a seizure, or a brain or other nervous system problem?   No   During the past year, have you received a transfusion of blood or blood    products, or been given immune (gamma) globulin or antiviral drug?   No   For women: Are you pregnant or is there a chance you could become       pregnant during the next month?   No   Have you received any vaccinations in the past 4 weeks?   No     Immunization questionnaire answers were all negative.        Per orders of Dr. Cardenas, injection of [id number] given by Deysi Jane CMA. Patient instructed to remain in clinic for 15 minutes afterwards, and to report any adverse reaction to me immediately.       Screening performed by Deysi Jane CMA on 9/15/2020 at 11:00 AM.

## 2020-09-15 NOTE — PROGRESS NOTES
Kenia is a 74 year old  female who presents for annual exam.             HPI:  The patient's PCP is  [facility].   Lots of exercise  Former   Needs vit D check, takes nexium  Weaned off the hormone replacement therapy and now has hot flashes  [place]      GYNECOLOGIC HISTORY:  No LMP recorded. Patient is postmenopausal..   reports that she has never smoked. She has never used smokeless tobacco.     She was on a very low dose of hormone replacement therapy but has weaned off    2 kids, both by c section  Not on blood thinners  Non smoker    Patient is not sexually active.    Last PHQ-9 score on record=   PHQ-9 SCORE 9/15/2020   PHQ-9 Total Score 2     Last GAD7 score on record=   NEELA-7 SCORE 2017 2019 9/15/2020   Total Score 1 2 1     Alcohol Score = 4    HEALTH MAINTENANCE:  Cholesterol: (No results found for: CHOL   Last Mammo: One year ago, Result: Normal, Next Mammo: Today   Pap: (No results found for: PAP )    Colonoscopy:  2019, Result:  Normal, Next Colonoscopy: 10 years.    Health maintenance updated:  no    HISTORY:  OB History    Para Term  AB Living   2 2 2 0 0 2   SAB TAB Ectopic Multiple Live Births   0 0 0 0 0      # Outcome Date GA Lbr [place]/2nd Weight Sex Delivery Anes PTL Lv   2 Term      CS-Unspec      1 Term      CS-Unspec        Patient Active Problem List   Diagnosis     fsocLUMBAR DISC DISPLACEMENT     Nonallopathic lesion of lumbar region     Nonallopathic lesion of thoracic region     Adhesive capsulitis     GERD (gastroesophageal reflux disease)     Vasomotor flushing     Past Surgical History:   Procedure Laterality Date     c-sections      X2     COLONOSCOPY N/A 2019    Procedure: COLONOSCOPY;  Surgeon: Marychuy Schwab MD;  Location:  GI     ORTHOPEDIC SURGERY  2004    risotomy, disectomy      Social History     Tobacco Use     Smoking status: Never Smoker     Smokeless tobacco:  "Never Used   Substance Use Topics     Alcohol use: Yes     Alcohol/week: 0.0 standard drinks     Frequency: 4 or more times a week     Drinks per session: 1 or 2     Binge frequency: Never     Comment: 1 daily       Problem (# of Occurrences) Relation (Name,Age of Onset)    Bladder Cancer (1) Mother    Breast Cancer (1) Paternal Grandmother    No Known Problems (5) Sister, Brother, Maternal Grandmother, Maternal Grandfather, Other    Prostate Cancer (1) Father            Current Outpatient Medications   Medication Sig     esomeprazole (NEXIUM) 20 MG capsule Take 1 capsule (20 mg) by mouth every morning (before breakfast) Take 30-60 minutes before eating.     medroxyPROGESTERone (PROVERA) 5 MG tablet TAKE 1 TABLET(5 MG) BY MOUTH DAILY     valACYclovir (VALTREX) 500 MG tablet Take 500 mg by mouth 2 times daily Reported on 5/8/2017     cyclobenzaprine (FLEXERIL) 5 MG tablet Take 5 mg by mouth as needed     No current facility-administered medications for this visit.        Allergies   Allergen Reactions     Penicillins Hives       Past medical, surgical, social and family history were reviewed and updated in EPIC.    ROS:   12 point review of systems negative other than symptoms noted below or in the HPI.  No urinary frequency or dysuria, bladder or kidney problems    EXAM:  /64   Pulse 68   Ht 1.685 m (5' 6.34\")   Wt 77.6 kg (171 lb)   BMI 27.32 kg/m     BMI: Body mass index is 27.32 kg/m .    EXAM:  Constitutional: Appearance: Well nourished, well developed alert, in no acute distress  Neck:  Lymph Nodes:  No lymphadenopathy present    Thyroid:  Gland size normal, nontender, no nodules or masses present  on palpation  Chest:  Respiratory Effort:  Breathing unlabored  Cardiovascular:Heart    Auscultation:  Regular rate, normal rhythm, no murmurs present  Breasts: Inspection of Breasts:  No lymphadenopathy present., Palpation of Breasts and Axillae:  No masses present on palpation, no breast tenderness., " Axillary Lymph Nodes:  No lymphadenopathy present. and No nodularity, asymmetry or nipple discharge bilaterally.  Gastrointestinal:  Abdominal Examination:  Abdomen nontender to palpation, tone normal without     rigidity or guarding, no masses present, umbilicus without lesions    Liver and speen:  No hepatomegaly present, liver nontender to palpation    Hernias:  No hernias present  Lymphatic: Lymph Nodes:  No other lymphadenopathy present  Skin:  General Inspection:  No rashes present, no lesions present, no areas of  discoloration.    Genitalia and Groin:  No rashes present, no lesions present, no areas of  discoloration, no masses present  Neurologic/Psychiatric:    Mental Status:  Oriented X3     Pelvic Exam:  External Genitalia:     Normal appearance for age, no discharge present, no tenderness present, no inflammatory lesions present, color normal  Vagina:     Normal vaginal vault without central or paravaginal defects, ATROPHIC  Bladder:     Nontender to palpation  Urethra:   Urethral Body:  Urethra palpation normal, urethra structural support normal   Urethral Meatus:  No erythema or lesions present  Cervix:     Appearance healthy, no lesions present, nontender to palpation, no bleeding present  Uterus:     Nontender to palpation, no masses present, position anteflexed, mobility: normal  Adnexa:     No adnexal tenderness present, no adnexal masses present  Perineum:     Perineum within normal limits, no evidence of trauma, no rashes or skin lesions present  Inguinal Lymph Nodes:     No lymphadenopathy present      COUNSELING:   Reviewed preventive health counseling, as reflected in patient instructions       Osteoporosis Prevention/Bone Health    BMI:  Body mass index is 27.32 kg/m .  Weight management plan: Discussed healthy diet and exercise guidelines   reports that she has never smoked. She has never used smokeless tobacco.      ASSESSMENT:  74 year old female with satisfactory annual exam.     ICD-10-CM    1. Encounter for gynecological examination without abnormal finding  Z01.419    2. Need for prophylactic vaccination and inoculation against influenza  Z23 FLUZONE HIGH DOSE 65+  [47189]     Vaccine Administration, Initial [19685]     Vaccine Administration, Each Additional [97084]   3. Need for vaccination  Z23 Pneumococcal vaccine 13 valent PCV13 IM (Prevnar) [58758]   4. Encounter for vitamin deficiency screening  Z13.21 Vitamin D Deficiency   5. Vitamin D deficiency, unspecified   E55.9 Vitamin D Deficiency       PLAN:  Needs vit d check due to daily nexium may cause deficiency due to poor GI absorption  Discussed hot flashes  Flu shot today  Mammogram  No pap  Return 1 years  Discussed covid research    Ivett Cardenas MD

## 2020-09-16 LAB — DEPRECATED CALCIDIOL+CALCIFEROL SERPL-MC: 36 UG/L (ref 20–75)

## 2020-09-16 ASSESSMENT — ANXIETY QUESTIONNAIRES: GAD7 TOTAL SCORE: 1

## 2020-09-18 ENCOUNTER — HOSPITAL ENCOUNTER (OUTPATIENT)
Dept: MAMMOGRAPHY | Facility: CLINIC | Age: 75
End: 2020-09-18
Attending: OBSTETRICS & GYNECOLOGY
Payer: COMMERCIAL

## 2020-09-18 DIAGNOSIS — R92.8 ABNORMAL MAMMOGRAM: ICD-10-CM

## 2020-09-18 DIAGNOSIS — Z11.59 ENCOUNTER FOR SCREENING FOR OTHER VIRAL DISEASES: Primary | ICD-10-CM

## 2020-09-18 PROCEDURE — 76642 ULTRASOUND BREAST LIMITED: CPT | Mod: RT

## 2020-09-18 PROCEDURE — 77065 DX MAMMO INCL CAD UNI: CPT | Mod: RT

## 2020-10-05 DIAGNOSIS — Z11.59 ENCOUNTER FOR SCREENING FOR OTHER VIRAL DISEASES: ICD-10-CM

## 2020-10-05 PROCEDURE — 99000 SPECIMEN HANDLING OFFICE-LAB: CPT | Performed by: PATHOLOGY

## 2020-10-05 PROCEDURE — U0003 INFECTIOUS AGENT DETECTION BY NUCLEIC ACID (DNA OR RNA); SEVERE ACUTE RESPIRATORY SYNDROME CORONAVIRUS 2 (SARS-COV-2) (CORONAVIRUS DISEASE [COVID-19]), AMPLIFIED PROBE TECHNIQUE, MAKING USE OF HIGH THROUGHPUT TECHNOLOGIES AS DESCRIBED BY CMS-2020-01-R: HCPCS | Mod: 90 | Performed by: PATHOLOGY

## 2020-10-06 LAB
SARS-COV-2 RNA SPEC QL NAA+PROBE: NOT DETECTED
SPECIMEN SOURCE: NORMAL

## 2020-10-07 ENCOUNTER — HOSPITAL ENCOUNTER (OUTPATIENT)
Dept: MAMMOGRAPHY | Facility: CLINIC | Age: 75
End: 2020-10-07
Attending: OBSTETRICS & GYNECOLOGY
Payer: COMMERCIAL

## 2020-10-07 DIAGNOSIS — R92.8 ABNORMAL MAMMOGRAM: ICD-10-CM

## 2020-10-07 PROCEDURE — 999N000065 MA POST PROCEDURE RIGHT

## 2020-10-07 PROCEDURE — 272N000032 MA STEREOTACTIC BREAST BIOPSY VACUUM RT

## 2020-10-07 PROCEDURE — 250N000009 HC RX 250: Performed by: OBSTETRICS & GYNECOLOGY

## 2020-10-07 PROCEDURE — 88305 TISSUE EXAM BY PATHOLOGIST: CPT | Mod: 26 | Performed by: PATHOLOGY

## 2020-10-07 PROCEDURE — 88305 TISSUE EXAM BY PATHOLOGIST: CPT | Mod: TC | Performed by: OBSTETRICS & GYNECOLOGY

## 2020-10-07 RX ADMIN — LIDOCAINE HYDROCHLORIDE 10 ML: 10; .005 INJECTION, SOLUTION EPIDURAL; INFILTRATION; INTRACAUDAL; PERINEURAL at 13:33

## 2020-10-07 RX ADMIN — LIDOCAINE HYDROCHLORIDE 5 ML: 10 INJECTION, SOLUTION INFILTRATION; PERINEURAL at 13:33

## 2020-10-07 NOTE — DISCHARGE INSTRUCTIONS
After Your Breast Biopsy    Bleeding or bruising: Slight bruising is normal.  If you bleed through the bandage, put direct pressure on the breast.  If you are still bleeding after 20 minutes, call the doctor who ordered the exam.    Bandages: Keep your bandage in place until tomorrow morning.  Do not get it wet.  Leave the tape in place for two days.  On the second day, cover it with a Band-Aid.    Activity: You may shower the morning after the exam.  No heavy activity (lifting, vacuuming) for 24 hours.    Discomfort: Wear your bra overnight to support the breast.  You may take Tylenol (acetaminophen) for pain.  If you had a stereotactic of MR-directed biopsy, you may take aspirin or ibuprofen (Advil, Motrin) the morning after your biopsy, unless your doctor tells you not to.    Infection: Infection is rare.  Symptoms include fever, redness, increasing pain and fluid draining from the biopsy site.  If you have any of these symptoms, please call the doctor who ordered your exam.    Results: Results may take up to three business days.  If you have not heard your results in three days, call the Breast Center Nurse at [phone] or [phone].  In rare cases, we may need to do another biopsy.    Call the doctor who ordered your exam if:    You have bleeding that lasts more than 20 minutes.    You have pain that cannot be controlled.    You have signs of infection (fever, redness, drainage or other signs).    You have not had your results within three days.    Nurse navigator: Our nurse navigator is here to answer your questions and help you set up future clinic visits.  Please call [phone].    Thank you for choosing [facility].  Please call us if you have questions or concerns about your biopsy.

## 2020-10-08 LAB — COPATH REPORT: NORMAL

## 2020-10-09 NOTE — PROGRESS NOTES
Kenia Rausch had a Stereotactic Right Breast Biopsy on 10/7/2020.  At the time of biopsy she requested we NOT call her the biopsy results until Monday, 10/12/2020 as she is playing in a golf tournament 10/9-10/11 and did not want to be stressed about results during that time.  Her biopsy results came in today 10/9/2020.  [facility] [place] radiologist, Dr. 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    We will contact Ms. Rausch on Monday 10/12/2020 with her results and recommended follow up.

## 2020-10-12 ENCOUNTER — TELEPHONE (OUTPATIENT)
Dept: MAMMOGRAPHY | Facility: CLINIC | Age: 75
End: 2020-10-12

## 2020-10-12 NOTE — TELEPHONE ENCOUNTER
After review by Breast Center Radiologist, Dr. River Murphy, Ms. Olsen was called and given her 10/7/2020 RIght Breast Biopsy results (see below) and recommended Follow up (Annual Screening).  Biopsy site without issues or concerns.   I encouraged her to contact her doctor with any further breast concerns.      Patient Name: ENRIQUETA OLSEN   MR#: [id number]   Specimen #: [id number]   Collected: 10/7/2020   Received: 10/7/2020   Reported: 10/8/2020 17:43   Ordering Phy(s): DEIDRE GONZÁLES     For improved result formatting, select 'View Enhanced Report Format' under    Linked Documents section.     SPECIMEN(S):   Right stereotactic breast needle biopsy, 8:00, 6.0cm from nipple, 0.3cm   size     FINAL DIAGNOSIS:   Right stereotactic breast needle biopsy, 8:00, 6.0cm from nipple, 0.3cm   size:   - Negative for malignancy, fibroadenomatous stroma with   microcalcifications, no atypical cellular   proliferations seen.     COMMENT:   This case was seen in intradepartmental consultation.     Electronically signed out by:     KRISTOPHER Kulkarni M.D.

## 2021-11-30 ENCOUNTER — HOSPITAL ENCOUNTER (OUTPATIENT)
Dept: MAMMOGRAPHY | Facility: CLINIC | Age: 76
Discharge: HOME | End: 2021-11-30
Attending: FAMILY MEDICINE | Admitting: FAMILY MEDICINE
Payer: COMMERCIAL

## 2021-11-30 DIAGNOSIS — Z12.31 VISIT FOR SCREENING MAMMOGRAM: ICD-10-CM

## 2021-11-30 PROCEDURE — 77063 BREAST TOMOSYNTHESIS BI: CPT

## 2021-12-26 ENCOUNTER — HEALTH MAINTENANCE LETTER (OUTPATIENT)
Age: 76
End: 2021-12-26

## 2022-10-22 ENCOUNTER — HEALTH MAINTENANCE LETTER (OUTPATIENT)
Age: 77
End: 2022-10-22

## 2022-12-20 ENCOUNTER — HOSPITAL ENCOUNTER (OUTPATIENT)
Dept: MAMMOGRAPHY | Facility: CLINIC | Age: 77
Discharge: HOME | End: 2022-12-20
Attending: FAMILY MEDICINE | Admitting: FAMILY MEDICINE
Payer: COMMERCIAL

## 2022-12-20 DIAGNOSIS — Z12.31 VISIT FOR SCREENING MAMMOGRAM: ICD-10-CM

## 2022-12-20 PROCEDURE — 77067 SCR MAMMO BI INCL CAD: CPT

## 2023-04-01 ENCOUNTER — HEALTH MAINTENANCE LETTER (OUTPATIENT)
Age: 78
End: 2023-04-01

## 2023-07-25 ENCOUNTER — TRANSCRIBE ORDERS (OUTPATIENT)
Dept: OTHER | Age: 78
End: 2023-07-25

## 2023-07-25 DIAGNOSIS — M70.60 GREATER TROCHANTERIC BURSITIS: Primary | ICD-10-CM

## 2023-07-25 DIAGNOSIS — M54.50 LOW BACK PAIN: ICD-10-CM

## 2023-08-25 ENCOUNTER — THERAPY VISIT (OUTPATIENT)
Dept: PHYSICAL THERAPY | Facility: CLINIC | Age: 78
End: 2023-08-25
Attending: FAMILY MEDICINE
Payer: COMMERCIAL

## 2023-08-25 DIAGNOSIS — G89.29 CHRONIC BILATERAL LOW BACK PAIN WITH RIGHT-SIDED SCIATICA: ICD-10-CM

## 2023-08-25 DIAGNOSIS — M54.41 CHRONIC BILATERAL LOW BACK PAIN WITH RIGHT-SIDED SCIATICA: ICD-10-CM

## 2023-08-25 DIAGNOSIS — M25.551 HIP PAIN, RIGHT: Primary | ICD-10-CM

## 2023-08-25 DIAGNOSIS — M54.50 LOW BACK PAIN: ICD-10-CM

## 2023-08-25 DIAGNOSIS — M70.60 GREATER TROCHANTERIC BURSITIS: ICD-10-CM

## 2023-08-25 PROCEDURE — 97110 THERAPEUTIC EXERCISES: CPT | Mod: GP | Performed by: PHYSICAL THERAPIST

## 2023-08-25 PROCEDURE — 97161 PT EVAL LOW COMPLEX 20 MIN: CPT | Mod: GP | Performed by: PHYSICAL THERAPIST

## 2023-08-26 PROBLEM — M25.551 HIP PAIN, RIGHT: Status: ACTIVE | Noted: 2023-08-26

## 2023-08-26 PROBLEM — M54.41 CHRONIC BILATERAL LOW BACK PAIN WITH RIGHT-SIDED SCIATICA: Status: ACTIVE | Noted: 2023-08-26

## 2023-08-26 PROBLEM — G89.29 CHRONIC BILATERAL LOW BACK PAIN WITH RIGHT-SIDED SCIATICA: Status: ACTIVE | Noted: 2023-08-26

## 2023-08-26 NOTE — PROGRESS NOTES
PHYSICAL THERAPY EVALUATION  Type of Visit: Evaluation    See electronic medical record for Abuse and Falls Screening details.    Subjective       Presenting condition or subjective complaint: right hip pain, fell and landed on the ice 2022, and have had 2 more falls on it.  I have some low back issues also.  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  Pain is Relieved By: rest, stretch, and water exercises  POSTURE: Standing Posture: Rounded shoulders, Forward head, right foot out  GAIT:   Gait Deviations:  decrease in hip extension, slight decrease control on right hip   BALANCE/PROPRIOCEPTION: Single Leg Stance Eyes Open (seconds): right 15 sec, left 30   ROM:   (Degrees) Left AROM Left PROM  Right AROM Right PROM   Hip Flexion  WFL   Slight tightness    Hip Extension  Minimal to moderate tightness   Moderate tightness   Hip Abduction  WFL   Moderate tightness   Hip Adduction       Hip Internal Rotation  WFL  WFL     Hip External Rotation  WFL  Moderate tightness   Knee Flexion       Knee Extension       Lumbar Side glide     Lumbar Flexion Toes increase in soreness   Lumbar Extension Minimal movement    Pain:   End feel:   PELVIC/SI SCREEN:  SI compression/distraction negative  STRENGTH:  hip abduction right 4/5, left 5-/5 hip extension right 4+/5, left 5-/5. IR/ER 5/5    MYOTOMES:    Left Right   T12-L3 (Hip Flexion) 5 5   L2-4 (Quads)  5 5   L4 (Ankle DF) 5 5   L5 (Great Toe Ext) 5- 5   S1 (Toe Raise)         NEURAL TENSION:  SLR.slump negative   FLEXIBILITY:  tightness in hamstring, hip flexors, quadriceps   LUMBAR/HIP Special Tests:  circumduction negative, bilateral greater trochanter bursitis positive    PALPATION:  bilateral greater trochanter bursa, right ITB and gluteal tendon.   SPINAL SEGMENTAL CONCLUSIONS:  NA  L1-L5 general tightness       Assessment & Plan   CLINICAL IMPRESSIONS  Medical Diagnosis: low back and bilateral greater trocahnteric bursitis    Treatment Diagnosis: low back and bilateral greater trocahnteric bursitis   Impression/Assessment: Patient is a 77 year old female with right low back and right hip pain  complaints.  The following significant findings have been identified: Pain, Decreased ROM/flexibility, Decreased joint mobility, Decreased strength, Impaired balance, Impaired muscle performance, and Impaired posture. These impairments interfere with their ability to perform self care tasks, recreational activities, household chores, household mobility, and community mobility as compared to previous level of function.     Clinical Decision Making (Complexity):  Clinical Presentation: Stable/Uncomplicated  Clinical Presentation Rationale: based on medical and personal factors listed in PT evaluation  Clinical Decision Making (Complexity): Low complexity    PLAN OF CARE  Treatment Interventions:  Interventions: Manual  Therapy, Neuromuscular Re-education, Therapeutic Activity, Therapeutic Exercise    Long Term Goals     PT Goal 1  Goal Description: able to walk 45 minutes without hip pain, no increase in back pain  Rationale: to maximize safety and independence with performance of ADLs and functional tasks;to maximize safety and independence within the home;to maximize safety and independence within the community;to maximize safety and independence with transportation  Goal Progress: walking 15 min with hip and back pain  Target Date: 11/17/23      Frequency of Treatment: 1x/ week then very other week  Duration of Treatment: 12 weeks    Recommended Referrals to Other Professionals:  none  Education Assessment:   Learner/Method: Patient;Demonstration;Pictures/Video;No Barriers to Learning    Risks and benefits of evaluation/treatment have been explained.   Patient/Family/caregiver agrees with Plan of Care.     Evaluation Time:     PT Eval, Low Complexity Minutes (50647): 14       Signing Clinician: AR Garcia [facility]                                                                                   OUTPATIENT PHYSICAL THERAPY      PLAN OF TREATMENT FOR OUTPATIENT REHABILITATION   Patient's Last Name, First Name, Kenia Vallejo YOB: 1945   Provider's Name   [facility]   Medical Record No.  [id number]     Onset Date: 09/01/22 (about)  Start of Care Date: 08/25/23     Medical Diagnosis:  low back and bilateral greater trocahnteric bursitis      PT Treatment Diagnosis:  low back and bilateral greater trocahnteric bursitis Plan of Treatment  Frequency/Duration: 1x/ week then very other week/ 12 weeks    Certification date from 08/25/23 to 11/17/23         See note for plan of treatment details and functional goals     Yadi Daniel, PT                         I CERTIFY THE NEED FOR THESE SERVICES FURNISHED UNDER        THIS  PLAN OF TREATMENT AND WHILE UNDER MY CARE .             Physician Signature               Date    X_____________________________________________________                    Referring Provider:  Dana Suarez      Initial Assessment  See Epic Evaluation- Start of Care Date: 08/25/23

## 2023-08-30 ENCOUNTER — THERAPY VISIT (OUTPATIENT)
Dept: PHYSICAL THERAPY | Facility: CLINIC | Age: 78
End: 2023-08-30
Attending: FAMILY MEDICINE
Payer: COMMERCIAL

## 2023-08-30 DIAGNOSIS — M54.41 CHRONIC BILATERAL LOW BACK PAIN WITH RIGHT-SIDED SCIATICA: ICD-10-CM

## 2023-08-30 DIAGNOSIS — G89.29 CHRONIC BILATERAL LOW BACK PAIN WITH RIGHT-SIDED SCIATICA: ICD-10-CM

## 2023-08-30 DIAGNOSIS — M25.551 HIP PAIN, RIGHT: Primary | ICD-10-CM

## 2023-08-30 PROCEDURE — 97110 THERAPEUTIC EXERCISES: CPT | Mod: GP | Performed by: PHYSICAL THERAPIST

## 2023-08-30 PROCEDURE — 97112 NEUROMUSCULAR REEDUCATION: CPT | Mod: GP | Performed by: PHYSICAL THERAPIST

## 2023-09-06 ENCOUNTER — THERAPY VISIT (OUTPATIENT)
Dept: PHYSICAL THERAPY | Facility: CLINIC | Age: 78
End: 2023-09-06
Attending: FAMILY MEDICINE
Payer: COMMERCIAL

## 2023-09-06 DIAGNOSIS — M25.551 HIP PAIN, RIGHT: Primary | ICD-10-CM

## 2023-09-06 DIAGNOSIS — G89.29 CHRONIC BILATERAL LOW BACK PAIN WITH RIGHT-SIDED SCIATICA: ICD-10-CM

## 2023-09-06 DIAGNOSIS — M54.41 CHRONIC BILATERAL LOW BACK PAIN WITH RIGHT-SIDED SCIATICA: ICD-10-CM

## 2023-09-06 PROCEDURE — 97112 NEUROMUSCULAR REEDUCATION: CPT | Mod: GP | Performed by: PHYSICAL THERAPIST

## 2023-09-06 PROCEDURE — 97110 THERAPEUTIC EXERCISES: CPT | Mod: GP | Performed by: PHYSICAL THERAPIST

## 2023-09-06 PROCEDURE — 97140 MANUAL THERAPY 1/> REGIONS: CPT | Mod: GP | Performed by: PHYSICAL THERAPIST

## 2023-09-28 ENCOUNTER — THERAPY VISIT (OUTPATIENT)
Dept: PHYSICAL THERAPY | Facility: CLINIC | Age: 78
End: 2023-09-28
Payer: COMMERCIAL

## 2023-09-28 DIAGNOSIS — M25.551 HIP PAIN, RIGHT: Primary | ICD-10-CM

## 2023-09-28 DIAGNOSIS — M54.41 CHRONIC BILATERAL LOW BACK PAIN WITH RIGHT-SIDED SCIATICA: ICD-10-CM

## 2023-09-28 DIAGNOSIS — G89.29 CHRONIC BILATERAL LOW BACK PAIN WITH RIGHT-SIDED SCIATICA: ICD-10-CM

## 2023-09-28 PROCEDURE — 97112 NEUROMUSCULAR REEDUCATION: CPT | Mod: GP | Performed by: PHYSICAL THERAPIST

## 2023-09-28 PROCEDURE — 97110 THERAPEUTIC EXERCISES: CPT | Mod: GP | Performed by: PHYSICAL THERAPIST

## 2023-12-22 ENCOUNTER — HOSPITAL ENCOUNTER (OUTPATIENT)
Dept: MAMMOGRAPHY | Facility: CLINIC | Age: 78
Discharge: HOME | End: 2023-12-22
Attending: FAMILY MEDICINE | Admitting: FAMILY MEDICINE
Payer: COMMERCIAL

## 2023-12-22 DIAGNOSIS — Z12.31 VISIT FOR SCREENING MAMMOGRAM: ICD-10-CM

## 2023-12-22 PROCEDURE — 77067 SCR MAMMO BI INCL CAD: CPT

## 2024-06-02 ENCOUNTER — HEALTH MAINTENANCE LETTER (OUTPATIENT)
Age: 79
End: 2024-06-02

## 2025-02-23 ENCOUNTER — HEALTH MAINTENANCE LETTER (OUTPATIENT)
Age: 80
End: 2025-02-23

## 2025-04-24 ENCOUNTER — HOSPITAL ENCOUNTER (OUTPATIENT)
Dept: MAMMOGRAPHY | Facility: CLINIC | Age: 80
Discharge: HOME | End: 2025-04-24
Attending: FAMILY MEDICINE
Payer: COMMERCIAL

## 2025-04-24 DIAGNOSIS — Z12.31 VISIT FOR SCREENING MAMMOGRAM: ICD-10-CM

## 2025-04-24 PROCEDURE — 77063 BREAST TOMOSYNTHESIS BI: CPT

## 2025-06-15 ENCOUNTER — HEALTH MAINTENANCE LETTER (OUTPATIENT)
Age: 80
End: 2025-06-15

## (undated) RX ORDER — FENTANYL CITRATE 50 UG/ML
INJECTION, SOLUTION INTRAMUSCULAR; INTRAVENOUS
Status: DISPENSED
Start: 2019-01-09